# Patient Record
Sex: MALE | Race: WHITE | NOT HISPANIC OR LATINO | URBAN - METROPOLITAN AREA
[De-identification: names, ages, dates, MRNs, and addresses within clinical notes are randomized per-mention and may not be internally consistent; named-entity substitution may affect disease eponyms.]

---

## 2022-12-21 ENCOUNTER — TELEPHONE (OUTPATIENT)
Dept: SURGERY | Facility: CLINIC | Age: 52
End: 2022-12-21
Payer: MEDICAID

## 2022-12-21 NOTE — TELEPHONE ENCOUNTER
----- Message from Vandana Chinchilla RN sent at 12/21/2022 11:53 AM CST -----  Regarding: FW: Referral: Sooner appt  Please call to schedule. Thanks!  ----- Message -----  From: Shayy Arnold  Sent: 12/21/2022  11:50 AM CST  To: , #  Subject: Referral: Sooner appt                            Dr. Romina Bradford would like to refer the patient. 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     I have scanned the patients referral and records into .     Please review and contact patient's family to schedule appointment.    Thank you,   Shayy Sullivan

## 2023-02-19 ENCOUNTER — HOSPITAL ENCOUNTER (EMERGENCY)
Facility: HOSPITAL | Age: 53
Discharge: LEFT BEFORE DISCHARGE | End: 2023-02-20
Attending: EMERGENCY MEDICINE
Payer: MEDICAID

## 2023-02-19 VITALS
SYSTOLIC BLOOD PRESSURE: 122 MMHG | OXYGEN SATURATION: 95 % | DIASTOLIC BLOOD PRESSURE: 70 MMHG | HEART RATE: 80 BPM | TEMPERATURE: 99 F | RESPIRATION RATE: 20 BRPM

## 2023-02-19 DIAGNOSIS — D72.829 LEUKOCYTOSIS: ICD-10-CM

## 2023-02-19 DIAGNOSIS — Z53.29 LEFT AGAINST MEDICAL ADVICE: ICD-10-CM

## 2023-02-19 DIAGNOSIS — R55 SYNCOPE: ICD-10-CM

## 2023-02-19 DIAGNOSIS — J18.9 PNEUMONIA OF RIGHT LUNG DUE TO INFECTIOUS ORGANISM, UNSPECIFIED PART OF LUNG: Primary | ICD-10-CM

## 2023-02-19 LAB
ALBUMIN SERPL BCP-MCNC: 3.5 G/DL (ref 3.5–5.2)
ALP SERPL-CCNC: 74 U/L (ref 55–135)
ALT SERPL W/O P-5'-P-CCNC: 11 U/L (ref 10–44)
ANION GAP SERPL CALC-SCNC: 8 MMOL/L (ref 8–16)
AST SERPL-CCNC: 17 U/L (ref 10–40)
BASOPHILS # BLD AUTO: 0.04 K/UL (ref 0–0.2)
BASOPHILS NFR BLD: 0.3 % (ref 0–1.9)
BILIRUB SERPL-MCNC: 0.2 MG/DL (ref 0.1–1)
BUN SERPL-MCNC: 16 MG/DL (ref 6–20)
CALCIUM SERPL-MCNC: 8.6 MG/DL (ref 8.7–10.5)
CHLORIDE SERPL-SCNC: 105 MMOL/L (ref 95–110)
CO2 SERPL-SCNC: 24 MMOL/L (ref 23–29)
CREAT SERPL-MCNC: 1.4 MG/DL (ref 0.5–1.4)
DIFFERENTIAL METHOD: ABNORMAL
EOSINOPHIL # BLD AUTO: 0 K/UL (ref 0–0.5)
EOSINOPHIL NFR BLD: 0.1 % (ref 0–8)
ERYTHROCYTE [DISTWIDTH] IN BLOOD BY AUTOMATED COUNT: 14.2 % (ref 11.5–14.5)
EST. GFR  (NO RACE VARIABLE): >60 ML/MIN/1.73 M^2
ETHANOL SERPL-MCNC: <10 MG/DL
GLUCOSE SERPL-MCNC: 140 MG/DL (ref 70–110)
HCT VFR BLD AUTO: 40.3 % (ref 40–54)
HCV AB SERPL QL IA: REACTIVE
HGB BLD-MCNC: 12.6 G/DL (ref 14–18)
IMM GRANULOCYTES # BLD AUTO: 0.09 K/UL (ref 0–0.04)
IMM GRANULOCYTES NFR BLD AUTO: 0.6 % (ref 0–0.5)
LACTATE SERPL-SCNC: 1.5 MMOL/L (ref 0.5–2.2)
LYMPHOCYTES # BLD AUTO: 0.7 K/UL (ref 1–4.8)
LYMPHOCYTES NFR BLD: 4.3 % (ref 18–48)
MCH RBC QN AUTO: 28.3 PG (ref 27–31)
MCHC RBC AUTO-ENTMCNC: 31.3 G/DL (ref 32–36)
MCV RBC AUTO: 91 FL (ref 82–98)
MONOCYTES # BLD AUTO: 1.7 K/UL (ref 0.3–1)
MONOCYTES NFR BLD: 11.2 % (ref 4–15)
NEUTROPHILS # BLD AUTO: 12.9 K/UL (ref 1.8–7.7)
NEUTROPHILS NFR BLD: 83.5 % (ref 38–73)
NRBC BLD-RTO: 0 /100 WBC
PLATELET # BLD AUTO: 259 K/UL (ref 150–450)
PMV BLD AUTO: 9.7 FL (ref 9.2–12.9)
POTASSIUM SERPL-SCNC: 5 MMOL/L (ref 3.5–5.1)
PROT SERPL-MCNC: 6.7 G/DL (ref 6–8.4)
RBC # BLD AUTO: 4.45 M/UL (ref 4.6–6.2)
SODIUM SERPL-SCNC: 137 MMOL/L (ref 136–145)
TROPONIN I SERPL DL<=0.01 NG/ML-MCNC: 0.03 NG/ML (ref 0–0.03)
WBC # BLD AUTO: 15.4 K/UL (ref 3.9–12.7)

## 2023-02-19 PROCEDURE — 80053 COMPREHEN METABOLIC PANEL: CPT | Performed by: STUDENT IN AN ORGANIZED HEALTH CARE EDUCATION/TRAINING PROGRAM

## 2023-02-19 PROCEDURE — 99285 PR EMERGENCY DEPT VISIT,LEVEL V: ICD-10-PCS | Mod: ,,, | Performed by: EMERGENCY MEDICINE

## 2023-02-19 PROCEDURE — 87522 HEPATITIS C REVRS TRNSCRPJ: CPT | Performed by: STUDENT IN AN ORGANIZED HEALTH CARE EDUCATION/TRAINING PROGRAM

## 2023-02-19 PROCEDURE — 96360 HYDRATION IV INFUSION INIT: CPT

## 2023-02-19 PROCEDURE — 93010 EKG 12-LEAD: ICD-10-PCS | Mod: ,,, | Performed by: INTERNAL MEDICINE

## 2023-02-19 PROCEDURE — 86803 HEPATITIS C AB TEST: CPT | Performed by: PHYSICIAN ASSISTANT

## 2023-02-19 PROCEDURE — 84484 ASSAY OF TROPONIN QUANT: CPT | Performed by: STUDENT IN AN ORGANIZED HEALTH CARE EDUCATION/TRAINING PROGRAM

## 2023-02-19 PROCEDURE — 87040 BLOOD CULTURE FOR BACTERIA: CPT | Performed by: STUDENT IN AN ORGANIZED HEALTH CARE EDUCATION/TRAINING PROGRAM

## 2023-02-19 PROCEDURE — 83605 ASSAY OF LACTIC ACID: CPT | Performed by: STUDENT IN AN ORGANIZED HEALTH CARE EDUCATION/TRAINING PROGRAM

## 2023-02-19 PROCEDURE — 99285 EMERGENCY DEPT VISIT HI MDM: CPT | Mod: 25

## 2023-02-19 PROCEDURE — 63600175 PHARM REV CODE 636 W HCPCS: Performed by: STUDENT IN AN ORGANIZED HEALTH CARE EDUCATION/TRAINING PROGRAM

## 2023-02-19 PROCEDURE — 99285 EMERGENCY DEPT VISIT HI MDM: CPT | Mod: ,,, | Performed by: EMERGENCY MEDICINE

## 2023-02-19 PROCEDURE — 93005 ELECTROCARDIOGRAM TRACING: CPT

## 2023-02-19 PROCEDURE — 93010 ELECTROCARDIOGRAM REPORT: CPT | Mod: ,,, | Performed by: INTERNAL MEDICINE

## 2023-02-19 PROCEDURE — 85025 COMPLETE CBC W/AUTO DIFF WBC: CPT | Performed by: STUDENT IN AN ORGANIZED HEALTH CARE EDUCATION/TRAINING PROGRAM

## 2023-02-19 PROCEDURE — 82077 ASSAY SPEC XCP UR&BREATH IA: CPT | Performed by: STUDENT IN AN ORGANIZED HEALTH CARE EDUCATION/TRAINING PROGRAM

## 2023-02-19 RX ORDER — AMOXICILLIN AND CLAVULANATE POTASSIUM 875; 125 MG/1; MG/1
1 TABLET, FILM COATED ORAL 2 TIMES DAILY
Qty: 10 TABLET | Refills: 0 | Status: SHIPPED | OUTPATIENT
Start: 2023-02-19 | End: 2023-02-24

## 2023-02-19 RX ORDER — AZITHROMYCIN 250 MG/1
500 TABLET, FILM COATED ORAL
Status: DISCONTINUED | OUTPATIENT
Start: 2023-02-19 | End: 2023-02-19

## 2023-02-19 RX ORDER — BICTEGRAVIR SODIUM, EMTRICITABINE, AND TENOFOVIR ALAFENAMIDE FUMARATE 50; 200; 25 MG/1; MG/1; MG/1
1 TABLET ORAL
COMMUNITY
Start: 2023-02-06

## 2023-02-19 RX ORDER — DOXYCYCLINE 100 MG/1
100 CAPSULE ORAL 2 TIMES DAILY
Qty: 10 CAPSULE | Refills: 0 | Status: SHIPPED | OUTPATIENT
Start: 2023-02-19 | End: 2023-02-24

## 2023-02-19 RX ORDER — CETIRIZINE HYDROCHLORIDE 10 MG/1
10 TABLET ORAL
COMMUNITY
Start: 2023-02-16

## 2023-02-19 RX ADMIN — SODIUM CHLORIDE, POTASSIUM CHLORIDE, SODIUM LACTATE AND CALCIUM CHLORIDE 1000 ML: 600; 310; 30; 20 INJECTION, SOLUTION INTRAVENOUS at 08:02

## 2023-02-19 NOTE — Clinical Note
Date: 2/19/2023  Patient: El Goldman  Admitted: 2/19/2023  8:28 PM  Attending Provider: Hira Mendez MD    El Goldman or his authorized caregiver has made the decision for the patient to leave the emergency department against the advice of  the emergency department staff. He or his authorized caregiver has been informed and understands the inherent risks, including death, coma, worsening pneumonia/infection.  He or his authorized caregiver has decided to accept the responsibility for th is decision. El Goldman and all necessary parties have been advised that he may return for further evaluation or treatment. His condition at time of discharge was stable.  El Goldman had current vital signs as follows:  /74   Pulse 81    Temp 98.5 °F (36.9 °C) (Oral)   Resp 16

## 2023-02-20 LAB
HCV RNA SERPL QL NAA+PROBE: NOT DETECTED
HCV RNA SPEC NAA+PROBE-ACNC: NOT DETECTED IU/ML

## 2023-02-20 NOTE — DISCHARGE INSTRUCTIONS
Please return to the ER if you have symptoms including fevers (temperature of a 100.4° and greater), development of chest pain or shortness of breath.  Your chest x-ray shows a possible pneumonia.  Your oxygen saturation was also low.  Please take antibiotics as prescribed for this pneumonia.

## 2023-02-20 NOTE — ED TRIAGE NOTES
"El Goldman, an 53 y.o. male presents to the ED via EMS after pt's friends called due to pt being "blue." Pt's friends told EMS that they gave him narcan, which he responded to. Pt denies any drug or ETOH use. Pt has syncopal episode with EMS. Pt arrives receiving bolus of NS and denies any complaints at this time. Pt is AAOx4, GCS 15.      Chief Complaint   Patient presents with    Loss of Consciousness     Pt was found blue by friends at home. Given narcan and became responsive. Found by EMS on scene sitting on couch, orthostatics completed and had syncopal episode in front of EMS. Given NS en route. Arrived at ER A+O x 4 with no complaints.     Review of patient's allergies indicates:   Allergen Reactions    Bactrim [sulfamethoxazole-trimethoprim]     Sulfa (sulfonamide antibiotics)      No past medical history on file.     Adult Physical Assessment  LOC: El Goldman, 53 y.o. male verified via two identifiers.  The patient is awake, alert, oriented and speaking appropriately at this time.  APPEARANCE: Patient resting comfortably and appears to be in no acute distress at this time. Patient is clean and well groomed, patient's clothing is properly fastened.  SKIN:The skin is warm and dry, color consistent with ethnicity, patient has normal skin turgor and moist mucus membranes, skin intact, no breakdown or brusing noted.  MUSCULOSKELETAL: Patient moving all extremities well, no obvious swelling or deformities noted.  RESPIRATORY: Airway is open and patent, respirations are spontaneous, patient has a normal effort and rate, no accessory muscle use noted.  CARDIAC: Patient has a normal rate and rhythm, no periphreal edema noted in any extremity, capillary refill < 3 seconds in all extremities  ABDOMEN: Soft and non tender to palpation, no abdominal distention noted. Bowel sounds present in all four quadrants.  NEUROLOGIC: Eyes open spontaneously, behavior appropriate to situation, follows commands, facial " expression symmetrical, bilateral hand grasp equal and even, purposeful motor response noted, normal sensation in all extremities when touched with a finger.

## 2023-02-20 NOTE — ED PROVIDER NOTES
"Encounter Date: 2/19/2023       History     Chief Complaint   Patient presents with    Loss of Consciousness     Pt was found blue by friends at home. Given narcan and became responsive. Found by EMS on scene sitting on couch, orthostatics completed and had syncopal episode in front of EMS. Given NS en route. Arrived at ER A+O x 4 with no complaints.     Patient is a 53-year-old male with a past medical history of HIV on HAART presenting to the ED via EMS for altered mental status and a syncopal event.  Per EMS, the patient's neighbor called ambulance after the patient was found "looking blue".  He was given Narcan with a positive response by EMS.  After getting this positive response, he then had a syncopal event with EMS without any head trauma.  Patient is not able to recall any of these events.  He denies any illicit drug use or pain medication, reports he only recently took Prozac.  He has no complaints at this time, including chest pain, shortness of breath or fevers.  Further denies suicidal or homicidal ideations.    The history is provided by the patient and medical records. No  was used.   Review of patient's allergies indicates:   Allergen Reactions    Bactrim [sulfamethoxazole-trimethoprim]     Sulfa (sulfonamide antibiotics)      Past Medical History:   Diagnosis Date    Human immunodeficiency virus (HIV) disease      History reviewed. No pertinent surgical history.  History reviewed. No pertinent family history.  Social History     Tobacco Use    Smoking status: Never     Passive exposure: Never    Smokeless tobacco: Never   Substance Use Topics    Drug use: Not Currently         Physical Exam     Initial Vitals [02/19/23 2021]   BP Pulse Resp Temp SpO2   (!) 106/55 93 16 98.5 °F (36.9 °C) (!) 94 %      MAP       --         Physical Exam    Nursing note and vitals reviewed.  Constitutional: He appears well-developed and well-nourished. He is not diaphoretic. No distress. "   Somnolent, but easily but arousable to voice.  Speaking full sentences.  No acute distress.   HENT:   Head: Normocephalic and atraumatic.   Right Ear: External ear normal.   Left Ear: External ear normal.   Neck: Neck supple.   Cardiovascular:  Normal rate, regular rhythm, normal heart sounds and intact distal pulses.           Pulmonary/Chest: Breath sounds normal. No respiratory distress. He has no wheezes. He has no rhonchi. He has no rales.   Breath sounds clear to auscultation bilaterally.   Abdominal: Abdomen is soft. He exhibits no distension. There is no abdominal tenderness. There is no rebound and no guarding.   Musculoskeletal:      Cervical back: Neck supple.     Neurological: He is alert and oriented to person, place, and time. GCS score is 15. GCS eye subscore is 4. GCS verbal subscore is 5. GCS motor subscore is 6.   Skin: Skin is warm. Capillary refill takes less than 2 seconds. No rash noted.   Psychiatric: He has a normal mood and affect.       ED Course   Procedures  Labs Reviewed   CBC W/ AUTO DIFFERENTIAL - Abnormal; Notable for the following components:       Result Value    WBC 15.40 (*)     RBC 4.45 (*)     Hemoglobin 12.6 (*)     MCHC 31.3 (*)     Immature Granulocytes 0.6 (*)     Gran # (ANC) 12.9 (*)     Immature Grans (Abs) 0.09 (*)     Lymph # 0.7 (*)     Mono # 1.7 (*)     Gran % 83.5 (*)     Lymph % 4.3 (*)     All other components within normal limits   COMPREHENSIVE METABOLIC PANEL - Abnormal; Notable for the following components:    Glucose 140 (*)     Calcium 8.6 (*)     All other components within normal limits    Narrative:     ADD ON TROPONIN PER DR PER NIETO/ORDER# [id number] @ 21:28   HEPATITIS C ANTIBODY - Abnormal; Notable for the following components:    Hepatitis C Ab Reactive (*)     All other components within normal limits    Narrative:     Release to patient->Immediate   TROPONIN I - Abnormal; Notable for the following components:    Troponin I 0.033 (*)     All  other components within normal limits    Narrative:     ADD ON TROPONIN PER DR PER NIETO/ORDER# [id number] @ 21:28   CULTURE, BLOOD   CULTURE, BLOOD   ALCOHOL,MEDICAL (ETHANOL)   HEPATITIS C RNA, QUANTITATIVE, PCR   LACTIC ACID, PLASMA     EKG Readings: (Independently Interpreted)   Initial Reading: No STEMI. Rhythm: Normal Sinus Rhythm. Heart Rate: 80. Ectopy: No Ectopy. Conduction: Normal. Clinical Impression: Normal Sinus Rhythm   No prior EKGs for comparison.   ECG Results              EKG 12-lead (Final result)  Result time 02/20/23 07:54:49      Final result by Interface, [facility] (02/20/23 07:54:49)                   Narrative:    Test Reason : R55,    Vent. Rate : 080 BPM     Atrial Rate : 080 BPM     P-R Int : 132 ms          QRS Dur : 088 ms      QT Int : 378 ms       P-R-T Axes : 017 029 027 degrees     QTc Int : 435 ms    Normal sinus rhythm  Normal ECG  No previous ECGs available  Confirmed by NOLBERTO PRAJAPATI MD ([id number]) on 2/20/2023 7:54:35 AM    Referred By: [email or web address]   SELF           Confirmed By:NOLBERTO PRAJAPATI MD                                  Imaging Results              X-Ray Chest AP Portable (Final result)  Result time 02/19/23 22:29:27      Final result by Fabrizio Gibson MD (02/19/23 22:29:27)                   Impression:      Asymmetric patchy airspace opacities in the right lower lung zone which could reflect aspiration or infectious process/developing pneumonia in the right clinical setting.  No confluent consolidation seen.      Electronically signed by: Fabrizio Gibson MD  Date:    02/19/2023  Time:    22:29               Narrative:    EXAMINATION:  XR CHEST AP PORTABLE    CLINICAL HISTORY:  Elevated white blood cell count, unspecified    TECHNIQUE:  Single frontal view of the chest was performed.    COMPARISON:  None    FINDINGS:  Cardiac silhouette is normal in size.  Lungs are symmetrically expanded.  Asymmetric increased patchy airspace opacities are seen in the right  lower lung zone.  No confluent consolidation seen.  No pneumothorax or significant pleural effusion.  No acute osseous abnormality identified.                                    X-Rays:   Independently Interpreted Readings:   Chest X-Ray: Normal heart size.  No acute abnormalities. There is an infiltrate in the RLL.   Medications   lactated ringers bolus 1,000 mL (0 mLs Intravenous Stopped 2/19/23 2546)     Medical Decision Making:   History:   I obtained history from: someone other than patient and EMS provider.  Old Medical Records: I decided to obtain old medical records.  Old Records Summarized: records from previous admission(s).  Initial Assessment:   Emergent evaluation of altered mental status with a positive response to Narcan.  He is afebrile and hemodynamically stable.  Differential Diagnosis:   Including, but not limited to: Opioid toxicity, ethanol, sepsis, bacteremia, aspiration vs other bacterial pneumonia, less likely UTI  Independently Interpreted Test(s):   I have ordered and independently interpreted X-rays - see summary below.       <> Summary of X-Ray Reading(s): Possible aspiration or PNA  I have ordered and independently interpreted EKG Reading(s) - see prior notes  Clinical Tests:   Lab Tests: Ordered and Reviewed  Radiological Study: Ordered and Reviewed  Medical Tests: Ordered and Reviewed  ED Management:  Labs remarkable for a leukocytosis of 15 9, which may be reactive though a chest x-ray was ordered and appears to have an infiltrate in the right lower lobe.  Mildly elevated troponin as well to 0.033, with no priors for comparison.  Recommended admission to patient, as he was also hypoxic on reassessment while sleeping to 89%.  Discussed results and findings of chest x-ray, leukocytosis and elevated troponin in the setting of a syncopal event with patient.  He declined admission and states that he wanted to go home.  He has full decision making capacity at this time.  He also declined a  repeat troponin and antibiotics here.  He was amenable to taking antibiotics outpatient.  Given prescription for Augmentin and doxycycline.  Discussed strict ED return precautions with patient, and he expressed understanding.          Attending Attestation:   Physician Attestation Statement for Resident:  As the supervising MD   Physician Attestation Statement: I have personally seen and examined this patient.   I agree with the above history.  -: Emergent evaluation of a 53-year-old male found unconscious.  Received Narcan with positive response.  Lost consciousness again.  History of HIV on Biktarvy.  Currently denies pain.  Does not recall events leading up to his presentation to the emergency department.   As the supervising MD I agree with the above PE.   -: No respiratory distress  Rales right lower lobe  RRR  No evidence of head trauma, no C/T/L spinal or paraspinal tenderness, no chest wall tenderness, abdomen nontender  Fully oriented, full strength and sensation all extremities   As the supervising MD I agree with the above treatment, course, plan, and disposition.   -: EKG without evidence of arrhythmia or ischemia.  Labs notable for leukocytosis, elevated troponin.  Chest x-ray with likely aspiration pneumonia.   Planned for IV antibiotics and admission to trend troponin and monitor pulse oximeter.  Patient is declining IV antibiotics in the ED and admission to the hospital.  He was not hypoxic during this conversation.  He is not suicidal.  He could not be convinced to stay for further treatment.  Advised that he see physician again as soon as possible.    The patient elected to leave AMA.  The patient verbalized and understood the risks associated, including death, permanent disability, and coma.  The patient has capacity to make this decision at this time.  I advised the patient to be seen by a physician again as soon as possible.       I have reviewed and agree with the residents interpretation of  the following: lab data, x-rays and EKG.  I have reviewed the following: old records at this facility.              ED Course as of 02/20/23 1601   Sun Feb 19, 2023 2125 WBC(!): 15.40  Leukocytosis - could be stress de-margination, but checking UA and XR [AB]      ED Course User Index  [AB] Hira Mendez MD                 Clinical Impression:   Final diagnoses:  [R55] Syncope  [D72.829] Leukocytosis  [J18.9] Pneumonia of right lung due to infectious organism, unspecified part of lung (Primary)  [Z53.29] Left against medical advice        ED Disposition Condition    AMA Stable                Bernard Ibarra MD  Resident  02/20/23 0102       Hira Mendez MD  02/20/23 1601

## 2023-02-24 LAB
BACTERIA BLD CULT: NORMAL
BACTERIA BLD CULT: NORMAL